# Patient Record
Sex: FEMALE | NOT HISPANIC OR LATINO | Employment: FULL TIME | URBAN - METROPOLITAN AREA
[De-identification: names, ages, dates, MRNs, and addresses within clinical notes are randomized per-mention and may not be internally consistent; named-entity substitution may affect disease eponyms.]

---

## 2018-02-16 ENCOUNTER — OFFICE VISIT (OUTPATIENT)
Dept: OBSTETRICS AND GYNECOLOGY | Facility: CLINIC | Age: 38
End: 2018-02-16

## 2018-02-16 VITALS
HEIGHT: 66 IN | DIASTOLIC BLOOD PRESSURE: 69 MMHG | BODY MASS INDEX: 28.45 KG/M2 | SYSTOLIC BLOOD PRESSURE: 120 MMHG | HEART RATE: 71 BPM | WEIGHT: 177 LBS

## 2018-02-16 DIAGNOSIS — Z01.419 PAP SMEAR, AS PART OF ROUTINE GYNECOLOGICAL EXAMINATION: Primary | ICD-10-CM

## 2018-02-16 DIAGNOSIS — N92.6 IRREGULAR BLEEDING: ICD-10-CM

## 2018-02-16 DIAGNOSIS — N95.1 MENOPAUSAL SYMPTOMS: ICD-10-CM

## 2018-02-16 PROCEDURE — 99395 PREV VISIT EST AGE 18-39: CPT | Performed by: OBSTETRICS & GYNECOLOGY

## 2018-02-16 NOTE — PROGRESS NOTES
Subjective   Karen Barron is a 37 y.o. female  2, Para 2 AB 0, Living 2.  Last annual 1y, last pap 1y, last mammogram 0, last colonoscopy 2016.  Cc: Annual exam  History of Present Illness  Had episodes of 2 periods in November and December(one time episode and patient gets morning hot flashes  The following portions of the patient's history were reviewed and updated as appropriate: allergies, current medications, past family history, past medical history, past social history, past surgical history and problem list.    Review of Systems   Genitourinary: Positive for menstrual problem.        Menopausal symptoms   All other systems reviewed and are negative.        Past Medical History:   Diagnosis Date   • Basal cell carcinoma    • Dysplasia of cervix    • Hidradenitis suppurativa      Menstrual History:  OB History      Para Term  AB Living    2 2 2   2    SAB TAB Ectopic Multiple Live Births                 Menarche age:10  Patient's last menstrual period was 2018 (exact date).       Past Surgical History:   Procedure Laterality Date   • CERVICAL CONE BIOPSY     •  SECTION      x2   • EAR TUBES     • SKIN BIOPSY       OB History      Para Term  AB Living    2 2 2   2    SAB TAB Ectopic Multiple Live Births                Family History   Problem Relation Age of Onset   • Melanoma Mother    • Diabetes Maternal Grandmother    • Cancer Maternal Grandfather      melanoma   • Breast cancer Maternal Aunt 50   • Breast cancer Maternal Aunt 50     History   Smoking Status   • Never Smoker   Smokeless Tobacco   • Never Used     History   Alcohol Use   • Yes     Comment: Occasional     Health Maintenance   Topic Date Due   • TDAP/TD VACCINES (1 - Tdap) 1998   • INFLUENZA VACCINE  2017   • PAP SMEAR  2019     No current outpatient prescriptions on file.  Sexual History:The  STD: HPV         Objective   Vitals:    18 1017   BP: 120/69   Pulse: 71   Weight:  "80.3 kg (177 lb)   Height: 167.6 cm (66\")     Physical Exam   Constitutional: She is oriented to person, place, and time. She appears well-developed and well-nourished.   HENT:   Head: Normocephalic.   Eyes: Pupils are equal, round, and reactive to light.   Neck: Normal range of motion. No thyromegaly present.   Cardiovascular: Normal rate, regular rhythm, normal heart sounds and intact distal pulses.    Pulmonary/Chest: Effort normal and breath sounds normal. No respiratory distress. She exhibits no tenderness. Right breast exhibits no inverted nipple, no mass, no nipple discharge, no skin change and no tenderness. Left breast exhibits no inverted nipple, no mass, no nipple discharge, no skin change and no tenderness. Breasts are symmetrical.   Abdominal: Soft. Bowel sounds are normal. Hernia confirmed negative in the right inguinal area and confirmed negative in the left inguinal area.   Genitourinary: Vagina normal and uterus normal. No breast tenderness or discharge. Pelvic exam was performed with patient supine. There is no rash, tenderness, lesion or injury on the right labia. There is no rash, tenderness, lesion or injury on the left labia. Uterus is not enlarged and not tender. Cervix exhibits no motion tenderness, no discharge and no friability. Right adnexum displays no mass, no tenderness and no fullness. Left adnexum displays no mass, no tenderness and no fullness.   Lymphadenopathy:     She has no cervical adenopathy.        Right: No inguinal adenopathy present.        Left: No inguinal adenopathy present.   Neurological: She is alert and oriented to person, place, and time. She has normal reflexes.   Skin: Skin is warm and dry.   Psychiatric: She has a normal mood and affect. Her behavior is normal. Judgment and thought content normal.         Assessment/Plan   Karen was seen today for gynecologic exam.    Diagnoses and all orders for this visit:    Pap smear, as part of routine gynecological " examination  -     IGP, Apt HPV,rfx 16 / 18,45 - ThinPrep Vial, Cervix    Irregular bleeding  Comments:  We'll observe her cycles for the next few months    Menopausal symptoms  Comments:  If symptoms persist with consider OCPs    Patient was counseled about breast self-examination, mammograms, colonoscopies, yearly Pap smears

## 2018-02-21 LAB
CYTOLOGIST CVX/VAG CYTO: NORMAL
CYTOLOGY CVX/VAG DOC THIN PREP: NORMAL
DX ICD CODE: NORMAL
HIV 1 & 2 AB SER-IMP: NORMAL
HPV I/H RISK 4 DNA CVX QL PROBE+SIG AMP: NEGATIVE
Lab: NORMAL
OTHER STN SPEC: NORMAL
PATH REPORT.FINAL DX SPEC: NORMAL
STAT OF ADQ CVX/VAG CYTO-IMP: NORMAL

## 2018-04-16 ENCOUNTER — OFFICE VISIT CONVERTED (OUTPATIENT)
Dept: FAMILY MEDICINE CLINIC | Facility: CLINIC | Age: 38
End: 2018-04-16
Attending: NURSE PRACTITIONER

## 2019-01-25 ENCOUNTER — CONVERSION ENCOUNTER (OUTPATIENT)
Dept: FAMILY MEDICINE CLINIC | Facility: CLINIC | Age: 39
End: 2019-01-25

## 2019-01-25 ENCOUNTER — OFFICE VISIT CONVERTED (OUTPATIENT)
Dept: FAMILY MEDICINE CLINIC | Facility: CLINIC | Age: 39
End: 2019-01-25
Attending: NURSE PRACTITIONER

## 2020-02-06 ENCOUNTER — OFFICE VISIT (OUTPATIENT)
Dept: OBSTETRICS AND GYNECOLOGY | Facility: CLINIC | Age: 40
End: 2020-02-06

## 2020-02-06 VITALS
HEIGHT: 66 IN | DIASTOLIC BLOOD PRESSURE: 73 MMHG | WEIGHT: 170 LBS | SYSTOLIC BLOOD PRESSURE: 110 MMHG | BODY MASS INDEX: 27.32 KG/M2 | HEART RATE: 64 BPM

## 2020-02-06 DIAGNOSIS — K63.5 POLYP OF COLON, UNSPECIFIED PART OF COLON, UNSPECIFIED TYPE: ICD-10-CM

## 2020-02-06 DIAGNOSIS — Z01.419 WELL WOMAN EXAM WITH ROUTINE GYNECOLOGICAL EXAM: Primary | ICD-10-CM

## 2020-02-06 PROCEDURE — 99396 PREV VISIT EST AGE 40-64: CPT | Performed by: OBSTETRICS & GYNECOLOGY

## 2020-02-06 RX ORDER — ALBUTEROL SULFATE 90 UG/1
AEROSOL, METERED RESPIRATORY (INHALATION)
COMMUNITY
End: 2020-02-06

## 2020-02-06 NOTE — PROGRESS NOTES
Chief Complaint   Patient presents with   • Annual Exam     last pap: 2.16.18 NILM -HPV, last mammo: not yet.         Karen Barron is a 39 y.o.  who presents for an annual examination   Sees Dr. aTbor for Dermatology due to h/o basal cell.   Reports she has a h/o colon polyps, due for colonoscopy (last in ).  Initially got scopes for constipation and reports that is worsening since starting the Keto diet.     Pap history:  Last pap: 2018 NIL, HPV negative  Prior abnormal paps: yes, conization around 2010, everything normal since  STDs  Sexually active: yes  History of STDs: genital warts (HPV)  Contraception:  none      Screening for BRCA-   Is patient's family history significant for BRCA risk factors? no    Past Medical History:   Diagnosis Date   • Abnormal Pap smear of cervix     CONE   • Basal cell carcinoma    • Dysplasia of cervix    • Hidradenitis suppurativa    • Skin cancer      Past Surgical History:   Procedure Laterality Date   • CERVICAL CONE BIOPSY     •  SECTION      x2   • EAR TUBES     • SKIN BIOPSY       OB History    Para Term  AB Living   2 2 2     2   SAB TAB Ectopic Molar Multiple Live Births             2      # Outcome Date GA Lbr Arturo/2nd Weight Sex Delivery Anes PTL Lv   2 Term      CS-LTranv      1 Term      CS-LTranv         Social History     Tobacco Use   • Smoking status: Never Smoker   • Smokeless tobacco: Never Used   Substance Use Topics   • Alcohol use: Yes     Comment: Occasional   • Drug use: No     Family History   Problem Relation Age of Onset   • Melanoma Mother    • Diabetes Maternal Grandmother    • Cancer Maternal Grandfather         melanoma   • Breast cancer Maternal Aunt 50   • Breast cancer Maternal Aunt 50   • Ovarian cancer Neg Hx    • Uterine cancer Neg Hx    • Colon cancer Neg Hx    • Deep vein thrombosis Neg Hx    • Pulmonary embolism Neg Hx      Current Outpatient Medications on File Prior to Visit   Medication Sig Dispense  "Refill   • [DISCONTINUED] albuterol sulfate HFA (PROAIR HFA) 108 (90 Base) MCG/ACT inhaler ProAir HFA 90 mcg/actuation aerosol inhaler       No current facility-administered medications on file prior to visit.      No Known Allergies     Review of Systems   Constitutional: Negative.    HENT: Negative.    Respiratory: Negative.    Cardiovascular: Negative.    Gastrointestinal: Negative.    Endocrine: Negative.    Genitourinary: Negative.    Musculoskeletal: Negative.    Skin: Negative.    Neurological: Negative.    Psychiatric/Behavioral: Negative.          OBJECTIVE:   Vitals:    02/06/20 1016   BP: 110/73   Pulse: 64   Weight: 77.1 kg (170 lb)   Height: 167.6 cm (66\")      Physical Exam   Constitutional: She is oriented to person, place, and time. She appears well-developed and well-nourished. No distress.   HENT:   Head: Normocephalic and atraumatic.   Eyes: EOM are normal. No scleral icterus.   Neck: Normal range of motion. Neck supple. No thyromegaly present.   Cardiovascular: Normal rate and regular rhythm. Exam reveals no gallop and no friction rub.   No murmur heard.  Pulmonary/Chest: Effort normal and breath sounds normal. No respiratory distress. She has no wheezes. She has no rales. She exhibits no tenderness. Right breast exhibits no inverted nipple. Left breast exhibits no inverted nipple. No breast swelling, tenderness, discharge or bleeding. Breasts are symmetrical.   Abdominal: Soft. Bowel sounds are normal. She exhibits no distension. There is no tenderness. There is no guarding.   Genitourinary: Vagina normal and uterus normal. There is no rash, tenderness, lesion, injury or Bartholin's cyst on the right labia. There is no rash, tenderness, lesion, injury or Bartholin's cyst on the left labia. Uterus is not mobile.   Cervix is not parous. Cervix does not exhibit motion tenderness, discharge, friability, lesion, polyp, nabothian cyst, eversion, pinkness or cyanosis. Right adnexum displays no mass, no " tenderness and no fullness. Right adnexum is present.Left adnexum displays no mass, no tenderness and no fullness. Left adnexum is present.No vaginal discharge found.   Musculoskeletal: She exhibits no edema or deformity.   Neurological: She is alert and oriented to person, place, and time.   Skin: Skin is warm and dry. She is not diaphoretic.   Psychiatric: She has a normal mood and affect. Her speech is normal and behavior is normal. Judgment and thought content normal. Cognition and memory are normal.       ASSESSMENT/PLAN:     Annual well woman exam:  Cervical cancer screening:    Remote h/o cervical dysplasia in past  The patient is due for a pap in 2023    Screening guidelines discussed with patient  Breast cancer screening:    Clinical breast exam recommended for age 20-39 years every 1-3 years   Mammogram recommended starting age 40    Breast self awareness encouraged  STD Screening   Testing declined.    Contraception :   declines    Family history    does not demonstrate need for genetics referral   Healthy lifestyle counseling:   attempt to lose weight and return for routine annual checkups    BMI Counseling  Her BMI is classified as BMI 25.0-29.9        Classification: overweight   Doing the Keto diet - in total has lost 20lbs      Return for mammogram due, 1 year for annual exam.

## 2020-05-21 ENCOUNTER — PROCEDURE VISIT (OUTPATIENT)
Dept: OBSTETRICS AND GYNECOLOGY | Facility: CLINIC | Age: 40
End: 2020-05-21

## 2020-05-21 ENCOUNTER — APPOINTMENT (OUTPATIENT)
Dept: WOMENS IMAGING | Facility: HOSPITAL | Age: 40
End: 2020-05-21

## 2020-05-21 DIAGNOSIS — Z12.31 VISIT FOR SCREENING MAMMOGRAM: Primary | ICD-10-CM

## 2020-05-21 PROCEDURE — 77063 BREAST TOMOSYNTHESIS BI: CPT | Performed by: RADIOLOGY

## 2020-05-21 PROCEDURE — 77067 SCR MAMMO BI INCL CAD: CPT | Performed by: OBSTETRICS & GYNECOLOGY

## 2020-05-21 PROCEDURE — 77063 BREAST TOMOSYNTHESIS BI: CPT | Performed by: OBSTETRICS & GYNECOLOGY

## 2020-05-21 PROCEDURE — 77067 SCR MAMMO BI INCL CAD: CPT | Performed by: RADIOLOGY

## 2020-05-27 ENCOUNTER — TELEPHONE (OUTPATIENT)
Dept: OBSTETRICS AND GYNECOLOGY | Facility: CLINIC | Age: 40
End: 2020-05-27

## 2020-05-27 DIAGNOSIS — R92.8 ABNORMAL MAMMOGRAM: Primary | ICD-10-CM

## 2020-06-16 ENCOUNTER — APPOINTMENT (OUTPATIENT)
Dept: WOMENS IMAGING | Facility: HOSPITAL | Age: 40
End: 2020-06-16

## 2020-06-16 PROCEDURE — 76641 ULTRASOUND BREAST COMPLETE: CPT | Performed by: RADIOLOGY

## 2020-06-16 PROCEDURE — 77065 DX MAMMO INCL CAD UNI: CPT | Performed by: RADIOLOGY

## 2020-06-16 PROCEDURE — 77061 BREAST TOMOSYNTHESIS UNI: CPT | Performed by: RADIOLOGY

## 2020-06-16 PROCEDURE — G0279 TOMOSYNTHESIS, MAMMO: HCPCS | Performed by: RADIOLOGY

## 2020-07-01 ENCOUNTER — TELEPHONE (OUTPATIENT)
Dept: OBSTETRICS AND GYNECOLOGY | Facility: CLINIC | Age: 40
End: 2020-07-01

## 2020-07-01 DIAGNOSIS — R92.8 ABNORMAL MAMMOGRAM: ICD-10-CM

## 2020-07-01 NOTE — TELEPHONE ENCOUNTER
Pt has been informed of results from DX Mammo & US.  She will return to screening mammogram in May 2021.  SR

## 2021-05-07 NOTE — PROGRESS NOTES
Progress Note      Patient Name: Karen Barron   Patient ID: [id number]   Sex: Female   YOB: 1980        Visit Date: January 25, 2019    Provider: PREETI Georges   Location: [facility]   Location Address: [address] [place] [address]  [id number]   Location Phone: [phone]          Chief Complaint     right ear pain and scratchy throat, started yesterday       History Of Present Illness  Karen Barron is a 38 year old /White female who presents for evaluation and treatment of:      right ear pain with scratchy throat since yesterday -     Has not taken any medication for symptoms other than her daily Flonase       Past Surgical History  Procedure Name Date Notes   Ceasarian section --  2         Medication List  Name Date Started Instructions   fluticasone 50 mcg/actuation nasal spray,suspension  spray 1 spray (50 mcg) in each nostril by intranasal route once daily         Allergy List  Allergen Name Date Reaction Notes   NO KNOWN DRUG ALLERGIES --  --  --          Family Medical History  Disease Name Relative/Age Notes   Arthrtis / Mother; Brother    Brother/     Mother/          Social History  Finding Status Start/Stop Quantity Notes   Alcohol Current every day --/-- --  drinks alcohol, 7 or less drinks per week   Exercises regularly --  --/-- --  1-2 times per week   Recreational Drug Use Never --/-- --  never used   Second hand smoke exposure Unknown --/-- --  yes   Tobacco Never --/-- --  never smoker, never uses other tobacco products   Uses seatbelts --  --/-- --  yes         Review of Systems  · Constitutional  o Denies  o : fever, chills, body aches  · HENT  o Admits  o : nasal discharge, sore throat, ear pain  o Denies  o : headaches, nasal congestion  · Respiratory  o Admits  o : cough more at night  o Denies  o : wheezing  · Gastrointestinal  o Denies  o : nausea, vomiting, diarrhea      Vitals  Date Time BP Position Site L\R Cuff Size HR RR  TEMP(F) WT  HT  BMI kg/m2 BSA m2 O2 Sat        01/25/2019 05:00 /80 Sitting    73 - R  97.8 185lbs 4oz    97 %           Physical Examination  · Constitutional  o Appearance  o : well developed, well-nourished, in no acute distress  · Eyes  o Conjunctivae  o : conjunctivae normal  o Pupils and Irises  o : pupils equal and round, pupils reactive to light bilaterally  · Ears, Nose, Mouth and Throat  o Ears  o :   § External Ears  § : no auricle tenderness to palpation present  § Otoscopic Examination  § : fluid present behind right TM, tympanic membrane scarring present   § Hearing  § : response to sound normal, no tinnitus  o Nose  o :   § Intranasal Exam  § : sinuses non tender to percussion  o Throat  o :   § Oropharynx  § : hyperemia noted   · Neck  o Inspection/Palpation  o : supple  o Thyroid  o : no thyromegaly  · Respiratory  o Respiratory Effort  o : breathing unlabored  o Auscultation of Lungs  o : clear to ascultation  · Cardiovascular  o Heart  o :   § Auscultation of Heart  § : regular rate and rhythm  o Peripheral Vascular System  o :   § Extremities  § : no edema  · Lymphatic  o Neck  o : no lymphadenopathy present  · Musculoskeletal  o General  o :   § General Musculoskeletal  § : No joint swelling or deformity. Muscle tone, strength, and development grossly normal.  · Skin and Subcutaneous Tissue  o General Inspection  o : NL tone  · Neurologic  o Gait and Station  o :   § Gait Screening  § : normal gait  · Psychiatric  o Mood and Affect  o : mood normal, affect appropriate              Assessment  · Upper respiratory infection     465.9/J06.9  · Sore throat     462/J02.9      Plan  · Orders  o IOP - Rapid Strep (41190) - 462/J02.9 - 01/25/2019   Negative  o ACO-39: Current medications updated and reviewed () - - 01/25/2019  · Instructions  o Patient was educated/instructed on their diagnosis, treatment and medications prior to discharge from the clinic today.  o Call with worsening and  will send in abx.  o May take OTC antihistamine for symptoms.             Electronically Signed by: PREETI Georges -Author on January 25, 2019 05:29:04 PM

## 2021-05-07 NOTE — PROGRESS NOTES
Progress Note      Patient Name: Karen Barron   Patient ID: [id number]   Sex: Female   YOB: 1980        Visit Date: April 16, 2018    Provider: PREEIT Ayala   Location: [facility]   Location Address: [address] [place] [address]  [id number]   Location Phone: [phone]          Chief Complaint     left ear pain started last night and right foot pain hurts on the top of the foot no known injury       History Of Present Illness  Karen Barron is a 38 year old /White female who presents for evaluation and treatment of:      Ear discomfort and foot discomfort    She reports that last night her left ear felt funny. It sounded like there was a wave her ear. Or it was thumping. This morning when she got out of no and it became much more sensitive and painful.  She had tubes placed in her ears as an adult.  She does not have upper respiratory symptoms. No head congestion, sore throat, drainage, or postnasal drip. No fever.     She also reports sensitivity to the top of her right foot. She has no memory of injury. Whenever she wears a shoe that has anything hard across the top of her foot like a flip flop it is very painful. Wearing regular shoes does not hurt-however it was a little uncomfortable at the end of the day after walking today. There has been no swelling. No redness.           Past Surgical History  Procedure Name Date Notes   Ceasarian section --  2         Allergy List  Allergen Name Date Reaction Notes   NO KNOWN DRUG ALLERGIES --  --  --          Family Medical History  Disease Name Relative/Age Notes   Arthrtis / Mother; Brother    Brother/     Mother/          Social History  Finding Status Start/Stop Quantity Notes   Alcohol Current every day --/-- --  drinks alcohol, 7 or less drinks per week   Exercises regularly --  --/-- --  1-2 times per week   Recreational Drug Use Never --/-- --  never used   Second hand smoke exposure Unknown  "--/-- --  yes   Tobacco Never --/-- --  never smoker, never uses other tobacco products   Uses seatbelts --  --/-- --  yes         Review of Systems  · Constitutional  o Denies  o : fever, headache  · Eyes  o Denies  o : discharge from eye, blurred vision  · HENT  o * See HPI  · Cardiovascular  o Denies  o : chest pain, palpitations  · Respiratory  o Denies  o : shortness of breath, wheezing, cough  · Gastrointestinal  o Denies  o : nausea or vomiting  · Integument  o Denies  o : rash  · Musculoskeletal  o * See HPI      Vitals  Date Time BP Position Site L\R Cuff Size HR RR TEMP(F) WT  HT  BMI kg/m2 BSA m2 O2 Sat HC       04/16/2018 05:48 /80 Sitting    72 - R  98 181lbs 4oz 5'  5\" 30.16 1.94 99 %           Physical Examination  · Constitutional  o Appearance  o : well developed, well-nourished, in no acute distress  · Head and Face  o HEENT  o : Unremarkable  · Eyes  o Conjunctivae  o : conjunctivae normal  o Pupils and Irises  o : pupils equal and round  · Ears, Nose, Mouth and Throat  o Ears  o :   § External Ears  § : appearance within normal limits, no lesions present  § Otoscopic Examination  § : Left TM has tube in place. The surrounding TM looks very angry  o Nose  o :   § External Nose  § : appearance normal  o Oral Cavity  o :   § Oral Mucosa  § : oral mucosa normal  § Lips  § : lip appearance normal  o Throat  o :   § Oropharynx  § : no inflammation or lesions present, tonsils within normal limits  · Neck  o Inspection/Palpation  o : supple  · Respiratory  o Respiratory Effort  o : breathing unlabored  o Auscultation of Lungs  o : clear to ascultation  · Cardiovascular  o Heart  o :   § Auscultation of Heart  § : regular rate and rhythm  · Lymphatic  o Neck  o : no lymphadenopathy present  · Musculoskeletal  o General  o :   § General Musculoskeletal  § : Muscle tone, strength, and development grossly normal.  o Extremeties/Joint  o : Right foot: No difference when compared to left. No swelling or " redness. No significant tenderness with palpation  · Skin and Subcutaneous Tissue  o General Inspection  o : warm and dry, not obvious abnormal lesions  · Neurologic  o Gait and Station  o :   § Gait Screening  § : normal gait  · Psychiatric  o Mood and Affect  o : mood normal, affect appropriate          Assessment  · Otitis media     382.9/H66.90  · Foot pain, right     729.5/M79.671      Plan  · Orders  o ACO-39: Current medications updated and reviewed () - - 04/16/2018  · Medications  o amoxicillin 875 mg oral tablet   SIG: take 1 tablet (875 mg) by oral route every 12 hours for 10 days   DISP: (20) tablets with 0 refills  Prescribed on 04/16/2018     · Instructions  o Take all medications as prescribed/directed.  o Patient was educated/instructed on their diagnosis, treatment and medications prior to discharge from the clinic today.  o I have given her an antibiotic for ear. Advised to use Tylenol and/or Motrin as needed for discomfort  o Foot discomfort appears to be from a nerve irritation. It appears to be superficial. If worsens or does not improve, follow up for further evaluation. Advised to avoid shoes that aggravate the discomfort  · Disposition  o Call or Return if symptoms worsen or persist.            Electronically Signed by: PREETI Ayala -Author on April 16, 2018 06:12:35 PM

## 2021-05-09 VITALS
OXYGEN SATURATION: 97 % | TEMPERATURE: 97.8 F | BODY MASS INDEX: 29.9 KG/M2 | HEART RATE: 73 BPM | SYSTOLIC BLOOD PRESSURE: 120 MMHG | DIASTOLIC BLOOD PRESSURE: 80 MMHG | WEIGHT: 185.25 LBS

## 2021-05-09 VITALS
HEIGHT: 65 IN | BODY MASS INDEX: 30.2 KG/M2 | SYSTOLIC BLOOD PRESSURE: 130 MMHG | HEART RATE: 72 BPM | OXYGEN SATURATION: 99 % | TEMPERATURE: 98 F | DIASTOLIC BLOOD PRESSURE: 80 MMHG | WEIGHT: 181.25 LBS

## 2021-07-19 ENCOUNTER — OFFICE VISIT (OUTPATIENT)
Dept: OBSTETRICS AND GYNECOLOGY | Facility: CLINIC | Age: 41
End: 2021-07-19

## 2021-07-19 ENCOUNTER — APPOINTMENT (OUTPATIENT)
Dept: WOMENS IMAGING | Facility: HOSPITAL | Age: 41
End: 2021-07-19

## 2021-07-19 ENCOUNTER — PROCEDURE VISIT (OUTPATIENT)
Dept: OBSTETRICS AND GYNECOLOGY | Facility: CLINIC | Age: 41
End: 2021-07-19

## 2021-07-19 VITALS
HEIGHT: 67 IN | WEIGHT: 192 LBS | SYSTOLIC BLOOD PRESSURE: 121 MMHG | HEART RATE: 60 BPM | DIASTOLIC BLOOD PRESSURE: 77 MMHG | BODY MASS INDEX: 30.13 KG/M2

## 2021-07-19 DIAGNOSIS — Z01.419 WELL WOMAN EXAM WITH ROUTINE GYNECOLOGICAL EXAM: Primary | ICD-10-CM

## 2021-07-19 DIAGNOSIS — Z12.31 VISIT FOR SCREENING MAMMOGRAM: Primary | ICD-10-CM

## 2021-07-19 PROBLEM — Q51.3 BICORNUATE UTERUS: Status: ACTIVE | Noted: 2021-07-19

## 2021-07-19 PROCEDURE — 77067 SCR MAMMO BI INCL CAD: CPT | Performed by: RADIOLOGY

## 2021-07-19 PROCEDURE — 77067 SCR MAMMO BI INCL CAD: CPT | Performed by: OBSTETRICS & GYNECOLOGY

## 2021-07-19 PROCEDURE — 99396 PREV VISIT EST AGE 40-64: CPT | Performed by: OBSTETRICS & GYNECOLOGY

## 2021-07-19 PROCEDURE — 77063 BREAST TOMOSYNTHESIS BI: CPT | Performed by: RADIOLOGY

## 2021-07-19 PROCEDURE — 77063 BREAST TOMOSYNTHESIS BI: CPT | Performed by: OBSTETRICS & GYNECOLOGY

## 2021-07-19 NOTE — PROGRESS NOTES
Chief Complaint   Patient presents with   • Annual Exam        Karen Barron is a 41 y.o.  who presents for an annual examination   Had lithotripsy in January   Had melanoma removed 3 weeks ago with negative margins.     Pap history:  Last pap: 2018 NIL, HPV negative  Prior abnormal paps: yes, conization around 2010, everything normal since  STDs  Sexually active: yes  History of STDs: genital warts (HPV)  Contraception:  none      Screening for BRCA-   Is patient's family history significant for BRCA risk factors? no    Past Medical History:   Diagnosis Date   • Abnormal Pap smear of cervix     CONE   • Basal cell carcinoma    • Dysplasia of cervix    • Hidradenitis suppurativa    • Kidney stone    • Melanoma (CMS/HCC)    • Skin cancer      Past Surgical History:   Procedure Laterality Date   • CERVICAL CONE BIOPSY     •  SECTION      x2   • CYSTOSCOPY BLADDER STONE LITHOTRIPSY     • EAR TUBES     • SKIN BIOPSY       OB History    Para Term  AB Living   2 2 2     2   SAB TAB Ectopic Molar Multiple Live Births             2      # Outcome Date GA Lbr Arturo/2nd Weight Sex Delivery Anes PTL Lv   2 Term      CS-LTranv   AYE   1 Term         AYE      Social History     Tobacco Use   • Smoking status: Never Smoker   • Smokeless tobacco: Never Used   Substance Use Topics   • Alcohol use: Yes     Comment: Occasional   • Drug use: No     Family History   Problem Relation Age of Onset   • Melanoma Mother    • Diabetes Maternal Grandmother    • Cancer Maternal Grandfather         melanoma   • Breast cancer Maternal Aunt 50   • Breast cancer Maternal Aunt 50   • Ovarian cancer Neg Hx    • Uterine cancer Neg Hx    • Colon cancer Neg Hx    • Deep vein thrombosis Neg Hx    • Pulmonary embolism Neg Hx      No current outpatient medications on file prior to visit.     No current facility-administered medications on file prior to visit.     No Known Allergies     Review of Systems   Constitutional:  "Negative.    HENT: Negative.    Respiratory: Negative.    Cardiovascular: Negative.    Gastrointestinal: Negative.    Endocrine: Negative.    Genitourinary: Negative.    Musculoskeletal: Negative.    Skin: Negative.    Neurological: Negative.    Psychiatric/Behavioral: Negative.          OBJECTIVE:   Vitals:    07/19/21 1336   BP: 121/77   Pulse: 60   Weight: 87.1 kg (192 lb)   Height: 170.2 cm (67\")      Physical Exam   Constitutional: She is oriented to person, place, and time. She appears well-developed and well-nourished. No distress.   HENT:   Head: Normocephalic and atraumatic.   Eyes: No scleral icterus.   Neck: No thyromegaly present.   Cardiovascular: Normal rate and regular rhythm. Exam reveals no gallop and no friction rub.   No murmur heard.  Pulmonary/Chest: Effort normal and breath sounds normal. No respiratory distress. She has no wheezes. She has no rales. She exhibits no tenderness. Right breast exhibits no inverted nipple. Left breast exhibits no inverted nipple. No breast swelling, tenderness, discharge or bleeding. Breasts are symmetrical.   Abdominal: Soft. Bowel sounds are normal. She exhibits no distension. There is no abdominal tenderness. There is no guarding.   Genitourinary: Vagina normal and uterus normal. There is no rash, tenderness, lesion, injury or Bartholin's cyst on the right labia. There is no rash, tenderness, lesion, injury or Bartholin's cyst on the left labia. Uterus is not mobile.   Cervix is not parous. Cervix does not exhibit motion tenderness, discharge, friability, lesion, polyp, nabothian cyst, eversion, pinkness or cyanosis. Right adnexum displays no mass, no tenderness and no fullness. Right adnexum is present.Left adnexum displays no mass, no tenderness and no fullness. Left adnexum is present.No vaginal discharge found.   Musculoskeletal: No deformity.   Neurological: She is alert and oriented to person, place, and time.   Skin: Skin is warm and dry. She is not " diaphoretic.   Psychiatric: Her speech is normal and behavior is normal. Judgment and thought content normal.       ASSESSMENT/PLAN:     Annual well woman exam:  Cervical cancer screening:    Remote h/o cervical dysplasia in past  The patient is due for a pap in 2023    Screening guidelines discussed with patient  Breast cancer screening:    Clinical breast exam recommended for age 20-39 years every 1-3 years   Mammogram recommended starting age 40    Breast self awareness encouraged  STD Screening   Testing declined.    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 Interested in ablation, but would recommend SIS to evaluate cavity first and discuss options.    Colonoscopy - due, follow up with GI      Return for Saline infusion sonogram.

## 2021-08-30 ENCOUNTER — OFFICE VISIT (OUTPATIENT)
Dept: OBSTETRICS AND GYNECOLOGY | Facility: CLINIC | Age: 41
End: 2021-08-30

## 2021-08-30 VITALS
HEART RATE: 69 BPM | DIASTOLIC BLOOD PRESSURE: 77 MMHG | SYSTOLIC BLOOD PRESSURE: 118 MMHG | WEIGHT: 193 LBS | BODY MASS INDEX: 30.29 KG/M2 | HEIGHT: 67 IN

## 2021-08-30 DIAGNOSIS — N93.9 ABNORMAL UTERINE BLEEDING (AUB): Primary | ICD-10-CM

## 2021-08-30 LAB
B-HCG UR QL: NEGATIVE
INTERNAL NEGATIVE CONTROL: NEGATIVE
INTERNAL POSITIVE CONTROL: POSITIVE
Lab: NORMAL

## 2021-08-30 PROCEDURE — 99213 OFFICE O/P EST LOW 20 MIN: CPT | Performed by: OBSTETRICS & GYNECOLOGY

## 2021-08-30 PROCEDURE — 81025 URINE PREGNANCY TEST: CPT | Performed by: OBSTETRICS & GYNECOLOGY

## 2021-08-30 PROCEDURE — 58100 BIOPSY OF UTERUS LINING: CPT | Performed by: OBSTETRICS & GYNECOLOGY

## 2021-08-30 RX ORDER — DICLOFENAC SODIUM 75 MG/1
75 TABLET, DELAYED RELEASE ORAL 2 TIMES DAILY
COMMUNITY
Start: 2021-07-20

## 2021-08-30 NOTE — PATIENT INSTRUCTIONS
"[email or web address]"> Diagnostic ultrasound (5th ed., pp. 528-563). Flo: Lilly.\"> [facility]'s procedures for primary care (4th ed., pp. 925-931). Flo: Lilly.\">   Sonohysterogram    A sonohysterogram is a procedure to examine the inside of the uterus. This exam uses sound waves that are sent to a computer to make images of the lining of the uterus (endometrium). To get the best images, a salt-water solution is put into the uterus through the vagina.  You may have this procedure if you have certain reproductive problems, such as abnormal bleeding, infertility, or miscarriage. This procedure can show what may be causing these problems. Possible causes of these problems include scarring or abnormal growths, such as fibroids, inside your uterus. It can also show if your uterus is an abnormal shape or if the lining of the uterus is too thin.  Tell a health care provider about:  · All medicines you are taking, including vitamins, herbs, eye drops, creams, and over-the-counter medicines.  · Any allergies you have.  · Any blood disorders you have.  · Any surgeries you have had.  · Any medical conditions you have.  · Whether you are pregnant or may be pregnant.  · The date of the first day of your last period.  · Any signs of infection, such as fever, pain in your lower abdomen, or abnormal discharge from your vagina. The procedure will not be done if you have an infection.  What are the risks?  Generally, this is a safe procedure. However, problems may occur, including:  · Abdominal pain or cramping.  · Light bleeding (spotting).  · Increased vaginal discharge.  · Infection.  What happens before the procedure?  · Your health care provider may have you take an over-the-counter pain medicine.  · You may be given medicine to stop any abnormal bleeding.  · You may be given antibiotic medicine to help prevent infection.  · You may be asked to take a pregnancy " test. This is usually in the form of a urine test. The procedure will not be done if you are pregnant.  · You may have a pelvic exam.  · You will be asked to empty your bladder.  What happens during the procedure?  · You will lie down on the exam table with your feet in stirrups or with your knees bent and your feet flat on the table.  · A slender, handheld device (transducer) will be lubricated and placed into your vagina.  · The transducer will be positioned to send sound waves to your uterus. The sound waves will be sent to a computer and turned into images, which your health care provider will see during the procedure.  · The transducer will be removed from your vagina.  · An instrument called a speculum will be inserted to widen the opening of your vagina.  · A swab with germ-killing solution (antiseptic) will be used to clean the opening to your uterus (cervix).  · A long, thin tube (catheter) will be placed through your cervix into your uterus, and the speculum will be removed.  · The transducer will be placed back into your vagina to take more images.  · Your uterus will be filled with a germ-free salt-water solution (sterile saline) through the catheter. You may feel some cramping.  · A fluid that contains air bubbles may be sent through the catheter to make it easier to see the fallopian tubes.  · The transducer and catheter will be removed.  The procedure may vary among health care providers and hospitals.  What can I expect after the procedure?  After the procedure, it is common to have:  · Light bleeding from your vagina (spotting).  · Pain or cramping in your abdomen.  · Watery discharge from your vagina.  Follow these instructions at home:  · Take over-the-counter and prescription medicines only as told by your health care provider.  · If you were prescribed an antibiotic medicine, take it as told by your health care provider. Do not stop taking the antibiotic even if you start to feel better.  · Wear  a sanitary pad or a tampon if you have spotting.  · Return to your normal activities as told by your health care provider. Ask your health care provider what activities are safe for you.  · It is up to you to get the results of your procedure. Ask your health care provider, or the department that is doing the procedure, when your results will be ready.  · Keep all follow-up visits as told by your health care provider. This is important.  Contact a health care provider if you have:  · Chills or a fever.  · Pain or cramping that does not go away even with medicine.  · An increase in vaginal discharge.  · Vaginal discharge that is yellow or green in color and has a bad smell.  · Nausea.  Get help right away if you have:  · Severe pain in your abdomen.  · Heavy bleeding from your vagina.  Summary  · A sonohysterogram is a procedure that creates images of the inside of the uterus.  · You may have this procedure if you have certain reproductive problems, such as abnormal bleeding, infertility, or miscarriage.  · You may need to have a pelvic exam and take a pregnancy test before this procedure. The procedure will not be done if you are pregnant or have an infection.  · After the procedure, it is common to have cramping and spotting.  This information is not intended to replace advice given to you by your health care provider. Make sure you discuss any questions you have with your health care provider.  Document Revised: 12/01/2020 Document Reviewed: 12/01/2020  [employer] Patient Education © 2021 [employer]

## 2021-08-30 NOTE — PROGRESS NOTES
Endometrial Biopsy Procedure Note    Pre-operative Diagnosis: AUB    Post-operative Diagnosis: Same    Indications: abnormal uterine bleeding    Procedure Details    Urine pregnancy test was done and was NEGATIVE .  The risks (including infection, bleeding, pain, and uterine perforation) and benefits of the procedure were explained to the patient and Verbal informed consent was obtained.  Antibiotic prophylaxis against endocarditis was not indicated.     A timeout procedure was performed.  The patient was placed in the dorsal lithotomy position.  Bimanual exam showed the uterus to be in the anteroflexed position.  A Graves' speculum inserted in the vagina, and the cervix prepped with povidone iodine.       A sharp tenaculum was applied to the anterior lip of the cervix for stabilization.   A Pipelle endometrial aspirator was used to sample the endometrium.  The Pipelle was passed but noted to be obstructed around 2 cm.  A cervical dilator was then used and unable to be passed into the endometrial cavity. The tenaculum was removed and hemostasis was noted.      Condition:  Stable    Complications:  Failed biopsy    Plan:  Patient tolerated attempted endometrial biopsy well.  Due to cervical stenosis, it was unable to be completed    Lori Davies MD

## 2021-08-30 NOTE — PROGRESS NOTES
SUBJECTIVE:   Chief Complaint   Patient presents with   • Procedure     SIS        Karen Barron is a 41 y.o.  who presents for ultrasound and discussion of further management of abnormal uterine bleeding.  Since her last visit, she has had one episode of bleeding.  She was informed of having a bicornuate uterus at the time of her first .  She sees a Urologist for nephrolithiasis.   She reports trying 3 oral contraceptive pills in the early  and it made it difficult for her to walk.  They thought she was having blood clots and recommend she come off.  She does not think she is tried a progestin only pill before  Past Medical History:   Diagnosis Date   • Abnormal Pap smear of cervix     CONE   • Basal cell carcinoma    • Dysplasia of cervix    • Hidradenitis suppurativa    • Kidney stone    • Melanoma (CMS/HCC)    • Skin cancer      Past Surgical History:   Procedure Laterality Date   • CERVICAL CONE BIOPSY     •  SECTION      x2   • CYSTOSCOPY BLADDER STONE LITHOTRIPSY     • EAR TUBES     • SKIN BIOPSY       OB History    Para Term  AB Living   2 2 2     2   SAB TAB Ectopic Molar Multiple Live Births             2      # Outcome Date GA Lbr Arturo/2nd Weight Sex Delivery Anes PTL Lv   2 Term      CS-LTranv   AYE   1 Term         AYE      Social History     Tobacco Use   • Smoking status: Never Smoker   • Smokeless tobacco: Never Used   Substance Use Topics   • Alcohol use: Yes     Comment: Occasional   • Drug use: No     Family History   Problem Relation Age of Onset   • Melanoma Mother    • Diabetes Maternal Grandmother    • Cancer Maternal Grandfather         melanoma   • Breast cancer Maternal Aunt 50   • Breast cancer Maternal Aunt 50   • Ovarian cancer Neg Hx    • Uterine cancer Neg Hx    • Colon cancer Neg Hx    • Deep vein thrombosis Neg Hx    • Pulmonary embolism Neg Hx      Current Outpatient Medications on File Prior to Visit   Medication Sig Dispense Refill   •  "diclofenac (VOLTAREN) 75 MG EC tablet Take 75 mg by mouth 2 (Two) Times a Day.       No current facility-administered medications on file prior to visit.     No Known Allergies     Review of Systems   Constitutional: Negative for activity change, appetite change, fatigue, fever and unexpected weight change.   Gastrointestinal: Negative for abdominal pain, nausea and vomiting.   Genitourinary: Positive for menstrual problem and vaginal bleeding. Negative for vaginal discharge and vaginal pain.   Hematological: Does not bruise/bleed easily.   Psychiatric/Behavioral: Negative for agitation.         OBJECTIVE:   Vitals:    08/30/21 0917   BP: 118/77   Pulse: 69   Weight: 87.5 kg (193 lb)   Height: 170.2 cm (67.01\")      Physical Exam  Exam conducted with a chaperone present.   Constitutional:       General: She is not in acute distress.     Appearance: She is well-developed. She is not diaphoretic.   HENT:      Head: Normocephalic and atraumatic.   Eyes:      General: No scleral icterus.     Extraocular Movements: Extraocular movements intact.   Pulmonary:      Effort: Pulmonary effort is normal. No respiratory distress.   Genitourinary:     Labia:         Right: No rash, tenderness, lesion or injury.         Left: No rash, tenderness, lesion or injury.       Vagina: Normal.      Comments: Stenotic os  Skin:     General: Skin is warm and dry.   Neurological:      General: No focal deficit present.      Mental Status: She is alert and oriented to person, place, and time.   Psychiatric:         Mood and Affect: Mood normal.         Behavior: Behavior normal.         Thought Content: Thought content normal.         Judgment: Judgment normal.         ASSESSMENT/PLAN:     ICD-10-CM ICD-9-CM   1. Abnormal uterine bleeding (AUB)  N93.9 626.9   Endometrial biopsy was attempted but unable to be completed secondary to cervical stenosis.  For this reason, saline infusion sonogram was also unable to be attempted.    We reviewed her " ultrasound which showed a bicornuate uterus with the dominant horn appearing to be on the left. She was counseled that her cervical stenosis prohibited us from getting an endometrial biopsy or doing a saline infusion sonogram.  We reviewed options for management including trial of norethindrone acetate or Slynd (progestin only pill) which she does not think she has tried before.  We also discussed the possibility of a dilation curettage.  I will reach out to [facility] to see if there is any additional data on bicornuate uteri endometrial ablation.  Additionally, she was counseled on uterine artery embolization and possibility of hysterectomy.  She would prefer hysterectomy as her last option.    Typically bicornuate uterus is a contraindication to an ablation and would unlikely be amenable to an IUD.      She will consider options and we will discuss over the phone.     Orders Placed This Encounter   Procedures   • POC Pregnancy, Urine     Order Specific Question:   Release to patient     Answer:   Immediate       No follow-ups on file.

## 2021-09-03 ENCOUNTER — DOCUMENTATION (OUTPATIENT)
Dept: OBSTETRICS AND GYNECOLOGY | Facility: CLINIC | Age: 41
End: 2021-09-03

## 2021-09-03 NOTE — PROGRESS NOTES
Patient to review options again regarding ablation.  No answer.  Left general voicemail for call back.  If she calls please inform her that I will be out next week but will call her when I return.

## 2024-05-15 ENCOUNTER — APPOINTMENT (OUTPATIENT)
Dept: WOMENS IMAGING | Facility: HOSPITAL | Age: 44
End: 2024-05-15
Payer: COMMERCIAL

## 2024-05-15 PROCEDURE — 77063 BREAST TOMOSYNTHESIS BI: CPT | Performed by: RADIOLOGY

## 2024-05-15 PROCEDURE — 77067 SCR MAMMO BI INCL CAD: CPT | Performed by: RADIOLOGY

## 2025-05-15 ENCOUNTER — TRANSCRIBE ORDERS (OUTPATIENT)
Dept: ADMINISTRATIVE | Facility: HOSPITAL | Age: 45
End: 2025-05-15
Payer: COMMERCIAL

## 2025-05-15 ENCOUNTER — HOSPITAL ENCOUNTER (OUTPATIENT)
Dept: GENERAL RADIOLOGY | Facility: HOSPITAL | Age: 45
Discharge: HOME | End: 2025-05-15
Payer: COMMERCIAL

## 2025-05-15 DIAGNOSIS — M54.2 CERVICALGIA: ICD-10-CM

## 2025-05-15 DIAGNOSIS — S46.011A STRAIN OF TENDON OF RIGHT ROTATOR CUFF, INITIAL ENCOUNTER: Primary | ICD-10-CM

## 2025-05-15 DIAGNOSIS — S46.011A STRAIN OF TENDON OF RIGHT ROTATOR CUFF, INITIAL ENCOUNTER: ICD-10-CM

## 2025-05-15 PROCEDURE — 73030 X-RAY EXAM OF SHOULDER: CPT

## 2025-05-15 PROCEDURE — 72040 X-RAY EXAM NECK SPINE 2-3 VW: CPT
